# Patient Record
Sex: MALE | Race: OTHER | HISPANIC OR LATINO | URBAN - METROPOLITAN AREA
[De-identification: names, ages, dates, MRNs, and addresses within clinical notes are randomized per-mention and may not be internally consistent; named-entity substitution may affect disease eponyms.]

---

## 2022-03-31 ENCOUNTER — LAB OUTSIDE AN ENCOUNTER (OUTPATIENT)
Dept: URBAN - METROPOLITAN AREA CLINIC 82 | Facility: CLINIC | Age: 52
End: 2022-03-31

## 2022-03-31 ENCOUNTER — OFFICE VISIT (OUTPATIENT)
Dept: URBAN - METROPOLITAN AREA CLINIC 82 | Facility: CLINIC | Age: 52
End: 2022-03-31
Payer: SELF-PAY

## 2022-03-31 ENCOUNTER — DASHBOARD ENCOUNTERS (OUTPATIENT)
Age: 52
End: 2022-03-31

## 2022-03-31 ENCOUNTER — TELEPHONE ENCOUNTER (OUTPATIENT)
Dept: URBAN - METROPOLITAN AREA CLINIC 82 | Facility: CLINIC | Age: 52
End: 2022-03-31

## 2022-03-31 VITALS
HEART RATE: 92 BPM | SYSTOLIC BLOOD PRESSURE: 107 MMHG | HEIGHT: 63 IN | TEMPERATURE: 97.2 F | WEIGHT: 154 LBS | BODY MASS INDEX: 27.29 KG/M2 | DIASTOLIC BLOOD PRESSURE: 73 MMHG

## 2022-03-31 DIAGNOSIS — Z12.12 ENCOUNTER FOR SCREENING FOR MALIGNANT NEOPLASM OF RECTUM: ICD-10-CM

## 2022-03-31 DIAGNOSIS — R10.33 PERIUMBILICAL ABDOMINAL PAIN: ICD-10-CM

## 2022-03-31 DIAGNOSIS — Z12.11 ENCOUNTER FOR SCREENING FOR MALIGNANT NEOPLASM OF COLON: ICD-10-CM

## 2022-03-31 PROBLEM — 305058001: Status: ACTIVE | Noted: 2022-03-31

## 2022-03-31 PROBLEM — 443503005: Status: ACTIVE | Noted: 2022-03-31

## 2022-03-31 PROCEDURE — 99203 OFFICE O/P NEW LOW 30 MIN: CPT | Performed by: INTERNAL MEDICINE

## 2022-03-31 RX ORDER — OMEPRAZOLE 20 MG/1
1 CAPSULE 30 MINUTES BEFORE MORNING MEAL CAPSULE, DELAYED RELEASE ORAL ONCE A DAY
Qty: 30 | Refills: 1 | OUTPATIENT
Start: 2022-03-31

## 2022-03-31 NOTE — HPI-TODAY'S VISIT:
52 y/o  man from [place] that came for eval due to abdominal pain. He went to Dr. Jackson office a few weeks ago and was found with elevated glucose ~450 and A1c 12%,high total cholesterol and triglycerides 900   proteinuria and he was referred to endocrinology and metformin was prescribe.He develops severe abdominal pain and went to [facility] were CT shows no solid organ abnormalities. U/A with pyuria and leukocytosis ,normal hgb .he was diagnosed with pyelonephritis and was discharge home on cephalex. Denies black stools or rectal bleeding.No family hx of colo-rectal cancer. No prior abdominal surgeries.no smoke. he also had some skin lesions in the back and think an insect bite him. No lesions on the legs. No rash or jaundice